# Patient Record
Sex: FEMALE | Race: NATIVE HAWAIIAN OR OTHER PACIFIC ISLANDER | URBAN - METROPOLITAN AREA
[De-identification: names, ages, dates, MRNs, and addresses within clinical notes are randomized per-mention and may not be internally consistent; named-entity substitution may affect disease eponyms.]

---

## 2021-05-07 ENCOUNTER — OFFICE VISIT (OUTPATIENT)
Dept: URBAN - METROPOLITAN AREA CLINIC 91 | Facility: CLINIC | Age: 56
End: 2021-05-07
Payer: MEDICARE

## 2021-05-07 DIAGNOSIS — H40.1131 PRIMARY OPEN-ANGLE GLAUCOMA, BILATERAL, MILD STAGE: Primary | ICD-10-CM

## 2021-05-07 DIAGNOSIS — H52.4 PRESBYOPIA: ICD-10-CM

## 2021-05-07 PROCEDURE — 92004 COMPRE OPH EXAM NEW PT 1/>: CPT | Performed by: OPHTHALMOLOGY

## 2021-05-07 ASSESSMENT — INTRAOCULAR PRESSURE
OD: 20
OS: 10

## 2021-05-07 ASSESSMENT — VISUAL ACUITY
OD: 20/30
OS: 20/20

## 2021-05-07 NOTE — IMPRESSION/PLAN
Impression: Primary open-angle glaucoma, bilateral, mild stage: H40.1131. Plan: Discussed primary open angle glaucoma of both eyes. I have stressed importance of patient compliance with follow-up appointments and using glaucoma drops as directed. I have explained the risk of irreversible vision loss and possible blindness associated with glaucoma. Will continue to monitor patient's glaucoma status with OCTg, visual field testing and fundus photography.

## 2021-08-13 ENCOUNTER — OFFICE VISIT (OUTPATIENT)
Dept: URBAN - METROPOLITAN AREA CLINIC 91 | Facility: CLINIC | Age: 56
End: 2021-08-13
Payer: MEDICARE

## 2021-08-13 DIAGNOSIS — Z94.7 CORNEAL TRANSPLANT STATUS: ICD-10-CM

## 2021-08-13 DIAGNOSIS — H04.123 DRY EYE SYNDROME OF BILATERAL LACRIMAL GLANDS: ICD-10-CM

## 2021-08-13 DIAGNOSIS — H26.491 OTHER SECONDARY CATARACT, RIGHT EYE: ICD-10-CM

## 2021-08-13 PROCEDURE — 99213 OFFICE O/P EST LOW 20 MIN: CPT | Performed by: OPHTHALMOLOGY

## 2021-08-13 PROCEDURE — 92083 EXTENDED VISUAL FIELD XM: CPT | Performed by: OPHTHALMOLOGY

## 2021-08-13 PROCEDURE — 92133 CPTRZD OPH DX IMG PST SGM ON: CPT | Performed by: OPHTHALMOLOGY

## 2021-08-13 RX ORDER — BRIMONIDINE TARTRATE, TIMOLOL MALEATE 2; 5 MG/ML; MG/ML
SOLUTION/ DROPS OPHTHALMIC
Qty: 7.5 | Refills: 3 | Status: INACTIVE
Start: 2021-08-13 | End: 2022-01-17

## 2021-08-13 ASSESSMENT — INTRAOCULAR PRESSURE
OD: 20
OS: 13

## 2021-08-13 NOTE — IMPRESSION/PLAN
Impression: Primary open-angle glaucoma, bilateral, mild stage: H40.1131. Plan: IOP's and OCTg/VF stable no changes OU. Continue using Combigan BID OD. Discussed primary open angle glaucoma of both eyes. I have stressed importance of patient compliance with follow-up appointments and using glaucoma drops as directed. I have explained the risk of irreversible vision loss and possible blindness associated with glaucoma. Will continue to monitor patient's glaucoma status with OCTg, visual field testing and fundus photography.

## 2021-08-13 NOTE — IMPRESSION/PLAN
Impression: Corneal transplant status: Z94.7. Plan: S/P cornea transplant right eye, recommend to continue using PF gtts QD OD, start Ofloxacin TID OD x3 days then d/c, will continue to monitor.

## 2022-04-01 ENCOUNTER — OFFICE VISIT (OUTPATIENT)
Dept: URBAN - METROPOLITAN AREA CLINIC 91 | Facility: CLINIC | Age: 57
End: 2022-04-01
Payer: MEDICARE

## 2022-04-01 PROCEDURE — 99214 OFFICE O/P EST MOD 30 MIN: CPT | Performed by: OPHTHALMOLOGY

## 2022-04-01 RX ORDER — BRIMONIDINE TARTRATE, TIMOLOL MALEATE 2; 5 MG/ML; MG/ML
SOLUTION/ DROPS OPHTHALMIC
Qty: 15 | Refills: 1 | Status: ACTIVE
Start: 2022-04-01

## 2022-04-01 RX ORDER — PREDNISOLONE ACETATE 10 MG/ML
1 % SUSPENSION/ DROPS OPHTHALMIC
Qty: 5 | Refills: 1 | Status: INACTIVE
Start: 2022-04-01 | End: 2022-04-01

## 2022-04-01 ASSESSMENT — INTRAOCULAR PRESSURE
OD: 29
OS: 10

## 2022-04-01 NOTE — IMPRESSION/PLAN
Impression: Primary open-angle glaucoma, bilateral, mild stage: H40.1131. Plan: Discussed primary open angle glaucoma of both eyes. I have stressed importance of patient compliance with follow-up appointments and using glaucoma drops as directed. I have explained the risk of irreversible vision loss and possible blindness associated with glaucoma. Will continue to monitor patient's glaucoma status with OCTg, visual field testing and fundus photography. Due to elevated IOP DC Prednisolone OD.

## 2022-04-15 ENCOUNTER — OFFICE VISIT (OUTPATIENT)
Dept: URBAN - METROPOLITAN AREA CLINIC 91 | Facility: CLINIC | Age: 57
End: 2022-04-15
Payer: MEDICARE

## 2022-04-15 DIAGNOSIS — H40.1131 PRIMARY OPEN-ANGLE GLAUCOMA, BILATERAL, MILD STAGE: Primary | ICD-10-CM

## 2022-04-15 PROCEDURE — 99213 OFFICE O/P EST LOW 20 MIN: CPT | Performed by: OPHTHALMOLOGY

## 2022-04-15 NOTE — IMPRESSION/PLAN
Impression: Primary open-angle glaucoma, bilateral, mild stage: H40.1131. Plan: Continue Combigan BID OU add lumigan to OD due to IOP and recheck in 3 weeks, sample provided Discussed primary open angle glaucoma of both eyes. I have stressed importance of patient compliance with follow-up appointments and using glaucoma drops as directed. I have explained the risk of irreversible vision loss and possible blindness associated with glaucoma. Will continue to monitor patient's glaucoma status with OCTg, visual field testing and fundus photography.

## 2022-05-11 ENCOUNTER — OFFICE VISIT (OUTPATIENT)
Dept: URBAN - METROPOLITAN AREA CLINIC 91 | Facility: CLINIC | Age: 57
End: 2022-05-11
Payer: MEDICARE

## 2022-05-11 DIAGNOSIS — H40.1131 PRIMARY OPEN-ANGLE GLAUCOMA, BILATERAL, MILD STAGE: ICD-10-CM

## 2022-05-11 DIAGNOSIS — H11.32 SUBCONJUNCTIVAL HEMORRHAGE OF LEFT EYE: ICD-10-CM

## 2022-05-11 PROCEDURE — 99214 OFFICE O/P EST MOD 30 MIN: CPT | Performed by: OPHTHALMOLOGY

## 2022-05-11 RX ORDER — PREDNISOLONE ACETATE 10 MG/ML
1 % SUSPENSION/ DROPS OPHTHALMIC
Qty: 5 | Refills: 2 | Status: ACTIVE
Start: 2022-05-11

## 2022-05-11 ASSESSMENT — INTRAOCULAR PRESSURE
OD: 19
OS: 13

## 2022-05-11 ASSESSMENT — VISUAL ACUITY: OD: 20/80

## 2022-05-11 NOTE — IMPRESSION/PLAN
Impression: Corneal transplant status: Z94.7. Plan: Due to sudden decrease in OD [facility] I am recommending patient to be seen for a corneal evaluation with Dr. Rajwinder Hager. Possible edematous corneal findings OD. Patient instructed to start Predforte QID OD.

## 2022-05-11 NOTE — IMPRESSION/PLAN
Impression: Primary open-angle glaucoma, bilateral, mild stage: H40.1131. Plan: IOP stable, continue Lumigan and [place] Discussed primary open angle glaucoma of both eyes. I have stressed importance of patient compliance with follow-up appointments and using glaucoma drops as directed. I have explained the risk of irreversible vision loss and possible blindness associated with glaucoma. Will continue to monitor patient's glaucoma status with OCTg, visual field testing and fundus photography.

## 2022-05-17 ENCOUNTER — OFFICE VISIT (OUTPATIENT)
Dept: URBAN - METROPOLITAN AREA CLINIC 91 | Facility: CLINIC | Age: 57
End: 2022-05-17
Payer: MEDICARE

## 2022-05-17 DIAGNOSIS — Z94.7 CORNEAL TRANSPLANT STATUS: Primary | ICD-10-CM

## 2022-05-17 PROCEDURE — 99212 OFFICE O/P EST SF 10 MIN: CPT | Performed by: SPECIALIST

## 2022-05-17 PROCEDURE — 92025 CPTRIZED CORNEAL TOPOGRAPHY: CPT | Performed by: SPECIALIST

## 2022-05-17 ASSESSMENT — INTRAOCULAR PRESSURE
OS: 10
OD: 17

## 2022-05-17 NOTE — IMPRESSION/PLAN
Impression: Corneal transplant status: Z94.7. Plan: Reviewed pentacam and discussed PKP status OD, cornea edema has resolved. Continue PF QID OD and in 6 months taper down gradually.  No sx recommended at this time

## 2022-06-22 ENCOUNTER — OFFICE VISIT (OUTPATIENT)
Dept: URBAN - METROPOLITAN AREA CLINIC 91 | Facility: CLINIC | Age: 57
End: 2022-06-22
Payer: MEDICARE

## 2022-06-22 DIAGNOSIS — Z94.7 CORNEAL TRANSPLANT STATUS: ICD-10-CM

## 2022-06-22 DIAGNOSIS — H40.1131 PRIMARY OPEN-ANGLE GLAUCOMA, BILATERAL, MILD STAGE: Primary | ICD-10-CM

## 2022-06-22 PROCEDURE — 99213 OFFICE O/P EST LOW 20 MIN: CPT | Performed by: OPHTHALMOLOGY

## 2022-06-22 RX ORDER — BRIMONIDINE TARTRATE, TIMOLOL MALEATE 2; 5 MG/ML; MG/ML
SOLUTION/ DROPS OPHTHALMIC
Qty: 15 | Refills: 3 | Status: ACTIVE
Start: 2022-06-22

## 2022-06-22 ASSESSMENT — INTRAOCULAR PRESSURE
OD: 18
OS: 8

## 2022-06-22 NOTE — IMPRESSION/PLAN
Impression: Primary open-angle glaucoma, bilateral, mild stage: H40.1131. Plan: IOP's stable d/c Lumigan and continue using Combigan BID OD. Discussed primary open angle glaucoma of both eyes. I have stressed importance of patient compliance with follow-up appointments and using glaucoma drops as directed. I have explained the risk of irreversible vision loss and possible blindness associated with glaucoma. Will continue to monitor patient's glaucoma status with OCTg, visual field testing and fundus photography.

## 2022-07-22 ENCOUNTER — OFFICE VISIT (OUTPATIENT)
Dept: URBAN - METROPOLITAN AREA CLINIC 91 | Facility: CLINIC | Age: 57
End: 2022-07-22
Payer: MEDICARE

## 2022-07-22 DIAGNOSIS — H40.1131 PRIMARY OPEN-ANGLE GLAUCOMA, BILATERAL, MILD STAGE: Primary | ICD-10-CM

## 2022-07-22 DIAGNOSIS — Z94.7 CORNEAL TRANSPLANT STATUS: ICD-10-CM

## 2022-07-22 DIAGNOSIS — B30.8 OTHER VIRAL CONJUNCTIVITIS: ICD-10-CM

## 2022-07-22 PROCEDURE — 99213 OFFICE O/P EST LOW 20 MIN: CPT | Performed by: OPHTHALMOLOGY

## 2022-07-22 RX ORDER — PREDNISOLONE ACETATE 10 MG/ML
1 % SUSPENSION/ DROPS OPHTHALMIC
Qty: 5 | Refills: 2 | Status: ACTIVE
Start: 2022-07-22

## 2022-07-22 RX ORDER — NEOMYCIN SULFATE, POLYMYXIN B SULFATE AND DEXAMETHASONE 3.5; 10000; 1 MG/ML; [USP'U]/ML; MG/ML
SUSPENSION OPHTHALMIC
Qty: 5 | Refills: 0 | Status: ACTIVE
Start: 2022-07-22

## 2022-07-22 ASSESSMENT — INTRAOCULAR PRESSURE
OS: 13
OD: 18

## 2022-07-22 NOTE — IMPRESSION/PLAN
Impression: Primary open-angle glaucoma, bilateral, mild stage: H40.1131. Plan: IOP's stable OU. Continue using Combigan BID OU. Discussed primary open angle glaucoma of both eyes. I have stressed importance of patient compliance with follow-up appointments and using glaucoma drops as directed. I have explained the risk of irreversible vision loss and possible blindness associated with glaucoma. Will continue to monitor patient's glaucoma status with OCTg, visual field testing and fundus photography.

## 2022-07-22 NOTE — IMPRESSION/PLAN
Impression: Corneal transplant status: Z94.7. Plan: S/P cornea transplant OD, doing well, recommend to use PF gtts BID OD. Will continue to monitor.

## 2022-07-22 NOTE — IMPRESSION/PLAN
Impression: Other viral conjunctivitis: B30.8. Plan: For bacterial conjunctivitis, I have stressed the contagious nature of the infection, and patient should wash their hands frequently in order to avoid spreading to others. I have also given patient Rx for topical antibiotic to use as directed. Start Maxitrol gtts BID OS.

## 2022-08-03 ENCOUNTER — OFFICE VISIT (OUTPATIENT)
Dept: URBAN - METROPOLITAN AREA CLINIC 91 | Facility: CLINIC | Age: 57
End: 2022-08-03
Payer: MEDICARE

## 2022-08-03 DIAGNOSIS — Z94.7 CORNEAL TRANSPLANT STATUS: ICD-10-CM

## 2022-08-03 DIAGNOSIS — B30.8 OTHER VIRAL CONJUNCTIVITIS: Primary | ICD-10-CM

## 2022-08-03 PROCEDURE — 99213 OFFICE O/P EST LOW 20 MIN: CPT | Performed by: OPHTHALMOLOGY

## 2022-08-03 RX ORDER — PREDNISOLONE ACETATE 10 MG/ML
1 % SUSPENSION/ DROPS OPHTHALMIC
Qty: 5 | Refills: 3 | Status: ACTIVE
Start: 2022-08-03

## 2022-08-03 NOTE — IMPRESSION/PLAN
Impression: Other viral conjunctivitis: B30.8.  Plan: OS Conjunctiviis resolved discontinue maxitrol gtts

## 2022-11-09 ENCOUNTER — OFFICE VISIT (OUTPATIENT)
Dept: URBAN - METROPOLITAN AREA CLINIC 91 | Facility: CLINIC | Age: 57
End: 2022-11-09
Payer: MEDICARE

## 2022-11-09 DIAGNOSIS — H40.1131 PRIMARY OPEN-ANGLE GLAUCOMA, BILATERAL, MILD STAGE: Primary | ICD-10-CM

## 2022-11-09 PROCEDURE — 99213 OFFICE O/P EST LOW 20 MIN: CPT | Performed by: OPHTHALMOLOGY

## 2022-11-09 RX ORDER — LATANOPROST 50 UG/ML
0.005 % SOLUTION OPHTHALMIC
Qty: 7.5 | Refills: 1 | Status: ACTIVE
Start: 2022-11-09

## 2022-11-09 ASSESSMENT — INTRAOCULAR PRESSURE
OS: 8
OD: 38

## 2022-11-09 NOTE — IMPRESSION/PLAN
Impression: Primary open-angle glaucoma, bilateral, mild stage: H40.1131. Plan: Due to elevated IOP OD recommend to start Latanoprost QHS OD and continue using Combigan BID OD. Discussed primary open angle glaucoma of both eyes. I have stressed importance of patient compliance with follow-up appointments and using glaucoma drops as directed. I have explained the risk of irreversible vision loss and possible blindness associated with glaucoma. Will continue to monitor patient's glaucoma status with OCTg, visual field testing and fundus photography.

## 2022-12-06 ENCOUNTER — OFFICE VISIT (OUTPATIENT)
Dept: URBAN - METROPOLITAN AREA CLINIC 91 | Facility: CLINIC | Age: 57
End: 2022-12-06
Payer: MEDICARE

## 2022-12-06 DIAGNOSIS — H40.1131 PRIMARY OPEN-ANGLE GLAUCOMA, BILATERAL, MILD STAGE: Primary | ICD-10-CM

## 2022-12-06 DIAGNOSIS — Z94.7 CORNEAL TRANSPLANT STATUS: ICD-10-CM

## 2022-12-06 PROCEDURE — 99213 OFFICE O/P EST LOW 20 MIN: CPT | Performed by: OPHTHALMOLOGY

## 2022-12-06 RX ORDER — BRIMONIDINE TARTRATE, TIMOLOL MALEATE 2; 5 MG/ML; MG/ML
SOLUTION/ DROPS OPHTHALMIC
Qty: 15 | Refills: 3 | Status: ACTIVE
Start: 2022-12-06

## 2022-12-06 RX ORDER — OFLOXACIN 3 MG/ML
0.3 % SOLUTION/ DROPS OPHTHALMIC
Qty: 5 | Refills: 0 | Status: ACTIVE
Start: 2022-12-06

## 2022-12-06 RX ORDER — LATANOPROST 50 UG/ML
0.005 % SOLUTION OPHTHALMIC
Qty: 7.5 | Refills: 3 | Status: ACTIVE
Start: 2022-12-06

## 2022-12-06 ASSESSMENT — INTRAOCULAR PRESSURE
OS: 7
OD: 20

## 2022-12-06 NOTE — IMPRESSION/PLAN
Impression: Primary open-angle glaucoma, bilateral, mild stage: H40.1131. Plan: IOP has improved Continue Latanoprost QHS OD and continue using Combigan BID OD. Discussed primary open angle glaucoma of both eyes. I have stressed importance of patient compliance with follow-up appointments and using glaucoma drops as directed. I have explained the risk of irreversible vision loss and possible blindness associated with glaucoma. Will continue to monitor patient's glaucoma status with OCTg, visual field testing and fundus photography.

## 2022-12-06 NOTE — IMPRESSION/PLAN
Impression: Corneal transplant status: Z94.7. Plan:  To prevent infection instructed patient to start Ofloaxcin gtts TID OU

## 2022-12-20 ENCOUNTER — OFFICE VISIT (OUTPATIENT)
Dept: URBAN - METROPOLITAN AREA CLINIC 91 | Facility: CLINIC | Age: 57
End: 2022-12-20
Payer: MEDICARE

## 2022-12-20 DIAGNOSIS — H04.123 DRY EYE SYNDROME OF BILATERAL LACRIMAL GLANDS: ICD-10-CM

## 2022-12-20 DIAGNOSIS — H40.1131 PRIMARY OPEN-ANGLE GLAUCOMA, BILATERAL, MILD STAGE: Primary | ICD-10-CM

## 2022-12-20 PROCEDURE — 99213 OFFICE O/P EST LOW 20 MIN: CPT | Performed by: OPHTHALMOLOGY

## 2022-12-20 ASSESSMENT — INTRAOCULAR PRESSURE
OS: 9
OD: 27

## 2022-12-20 NOTE — IMPRESSION/PLAN
Impression: Primary open-angle glaucoma, bilateral, mild stage: H40.1131. Plan: Borderline IOP's emphasize importance of using glaucoma drops directed. Continue using Latanoprost QHS OD and continue using Combigan BID OD. Discussed primary open angle glaucoma of both eyes. I have stressed importance of patient compliance with follow-up appointments and using glaucoma drops as directed. I have explained the risk of irreversible vision loss and possible blindness associated with glaucoma.

## 2023-01-03 ENCOUNTER — OFFICE VISIT (OUTPATIENT)
Dept: URBAN - METROPOLITAN AREA CLINIC 91 | Facility: CLINIC | Age: 58
End: 2023-01-03
Payer: MEDICARE

## 2023-01-03 DIAGNOSIS — H40.1131 PRIMARY OPEN-ANGLE GLAUCOMA, BILATERAL, MILD STAGE: Primary | ICD-10-CM

## 2023-01-03 DIAGNOSIS — H04.123 DRY EYE SYNDROME OF BILATERAL LACRIMAL GLANDS: ICD-10-CM

## 2023-01-03 PROCEDURE — 99213 OFFICE O/P EST LOW 20 MIN: CPT | Performed by: OPHTHALMOLOGY

## 2023-01-03 ASSESSMENT — INTRAOCULAR PRESSURE
OS: 9
OD: 22

## 2023-01-03 NOTE — IMPRESSION/PLAN
Impression: Primary open-angle glaucoma, bilateral, mild stage: H40.1131. Plan: IOP's improved,  Continue using Latanoprost QHS OD and continue using Combigan BID OD. Discussed primary open angle glaucoma of both eyes. I have stressed importance of patient compliance with follow-up appointments and using glaucoma drops as directed. I have explained the risk of irreversible vision loss and possible blindness associated with glaucoma.

## 2023-02-23 ENCOUNTER — OFFICE VISIT (OUTPATIENT)
Dept: URBAN - METROPOLITAN AREA CLINIC 91 | Facility: CLINIC | Age: 58
End: 2023-02-23
Payer: MEDICARE

## 2023-02-23 DIAGNOSIS — H40.1131 PRIMARY OPEN-ANGLE GLAUCOMA, BILATERAL, MILD STAGE: ICD-10-CM

## 2023-02-23 DIAGNOSIS — H04.123 DRY EYE SYNDROME OF BILATERAL LACRIMAL GLANDS: Primary | ICD-10-CM

## 2023-02-23 DIAGNOSIS — Z94.7 CORNEAL TRANSPLANT STATUS: ICD-10-CM

## 2023-02-23 PROCEDURE — 99213 OFFICE O/P EST LOW 20 MIN: CPT | Performed by: OPHTHALMOLOGY

## 2023-02-23 RX ORDER — BRIMONIDINE TARTRATE, TIMOLOL MALEATE 2; 5 MG/ML; MG/ML
SOLUTION/ DROPS OPHTHALMIC
Qty: 15 | Refills: 3 | Status: ACTIVE
Start: 2023-02-23

## 2023-02-23 RX ORDER — PREDNISOLONE ACETATE 10 MG/ML
1 % SUSPENSION/ DROPS OPHTHALMIC
Qty: 5 | Refills: 3 | Status: ACTIVE
Start: 2023-02-23

## 2023-02-23 ASSESSMENT — INTRAOCULAR PRESSURE
OD: 26
OS: 10

## 2023-02-23 ASSESSMENT — VISUAL ACUITY
OD: 20/80
OS: 20/50

## 2023-02-23 NOTE — IMPRESSION/PLAN
Impression: Primary open-angle glaucoma, bilateral, mild stage: H40.1131. Plan: Explained to patient elevated IOP's are due to diffuse PEK's OU, Continue using Combigan BID  and Latanoprost QHS OU. Will continue to monitor.

## 2023-02-23 NOTE — IMPRESSION/PLAN
Impression: Corneal transplant status: Z94.7. Plan: Instructed patient to increase Pred gtts BID OU due to severe dry eyes.

## 2023-03-23 ENCOUNTER — OFFICE VISIT (OUTPATIENT)
Dept: URBAN - METROPOLITAN AREA CLINIC 91 | Facility: CLINIC | Age: 58
End: 2023-03-23
Payer: MEDICARE

## 2023-03-23 DIAGNOSIS — Z94.7 CORNEAL TRANSPLANT STATUS: Primary | ICD-10-CM

## 2023-03-23 PROCEDURE — 99213 OFFICE O/P EST LOW 20 MIN: CPT | Performed by: OPHTHALMOLOGY

## 2023-03-23 ASSESSMENT — INTRAOCULAR PRESSURE
OD: 22
OS: 8

## 2023-03-23 NOTE — IMPRESSION/PLAN
Impression: Corneal transplant status: Z94.7. Plan: S/P cornea transplant Instructed patient to continue using  Pred gtts BID OD. Will continue to monitor.

## 2025-05-22 ENCOUNTER — APPOINTMENT (OUTPATIENT)
Age: 60
Setting detail: DERMATOLOGY
End: 2025-05-22

## 2025-05-22 DIAGNOSIS — L72.0 EPIDERMAL CYST: ICD-10-CM

## 2025-05-22 DIAGNOSIS — L73.2 HIDRADENITIS SUPPURATIVA: ICD-10-CM

## 2025-05-22 DIAGNOSIS — L81.4 OTHER MELANIN HYPERPIGMENTATION: ICD-10-CM

## 2025-05-22 DIAGNOSIS — D22 MELANOCYTIC NEVI: ICD-10-CM

## 2025-05-22 PROBLEM — D22.39 MELANOCYTIC NEVI OF OTHER PARTS OF FACE: Status: ACTIVE | Noted: 2025-05-22

## 2025-05-22 PROCEDURE — ? COUNSELING

## 2025-05-22 PROCEDURE — ? ADDITIONAL NOTES

## 2025-05-22 PROCEDURE — ? PRESCRIPTION

## 2025-05-22 PROCEDURE — 99203 OFFICE O/P NEW LOW 30 MIN: CPT

## 2025-05-22 PROCEDURE — ? TREATMENT REGIMEN

## 2025-05-22 PROCEDURE — ? BIMZELX INITIATION

## 2025-05-22 RX ORDER — DOXYCYCLINE HYCLATE 100 MG/1
CAPSULE, GELATIN COATED ORAL
Qty: 30 | Refills: 0 | Status: ERX | COMMUNITY
Start: 2025-05-22

## 2025-05-22 RX ORDER — CLINDAMYCIN PHOSPHATE 10 MG/G
GEL TOPICAL
Qty: 60 | Refills: 0 | Status: ERX | COMMUNITY
Start: 2025-05-22

## 2025-05-22 RX ADMIN — CLINDAMYCIN PHOSPHATE: 10 GEL TOPICAL at 00:00

## 2025-05-22 RX ADMIN — DOXYCYCLINE HYCLATE: 100 CAPSULE, GELATIN COATED ORAL at 00:00

## 2025-05-22 ASSESSMENT — LOCATION DETAILED DESCRIPTION DERM
LOCATION DETAILED: LEFT LABIUM MAJUS
LOCATION DETAILED: LEFT FOREHEAD
LOCATION DETAILED: LEFT INFERIOR CENTRAL MALAR CHEEK
LOCATION DETAILED: HAIR
LOCATION DETAILED: HAIR
LOCATION DETAILED: RIGHT LABIUM MAJUS

## 2025-05-22 ASSESSMENT — LOCATION ZONE DERM
LOCATION ZONE: SCALP
LOCATION ZONE: VULVA
LOCATION ZONE: SCALP
LOCATION ZONE: FACE

## 2025-05-22 ASSESSMENT — LOCATION SIMPLE DESCRIPTION DERM
LOCATION SIMPLE: LABIA MAJORA
LOCATION SIMPLE: HAIR
LOCATION SIMPLE: LEFT FOREHEAD
LOCATION SIMPLE: HAIR
LOCATION SIMPLE: LEFT CHEEK

## 2025-05-22 NOTE — PROCEDURE: ADDITIONAL NOTES
Render Risk Assessment In Note?: no
Detail Level: Simple
Additional Notes: Discussed with patient possibility of injection biologics, bimzelx, humira, cosentyx etc. informed patient of minimal infection, we could try to get this approved with a prior authorization.
Additional Notes: Patient will return in a month to go over cyst in scalp, I offered patient k3 injection today.

## 2025-05-22 NOTE — PROCEDURE: BIMZELX INITIATION
Bimzelx Dosing: 320 mg SC week 0, 4, 8, 12 and 16 then every 8 weeks thereafter
Detail Level: Zone
Diagnosis (Required): Hidradenitis Suppurativa
Add Pregnancy And Lactation Warning To Medication Counseling?: No
Pregnancy And Lactation Warning Text: The risk during pregnancy and breastfeeding is uncertain with this medication.
Bimzelx Monitoring Guidelines: A yearly test for tuberculosis is required while taking Bimzelx.

## 2025-05-22 NOTE — HPI: SKIN LESION
What Type Of Note Output Would You Prefer (Optional)?: Standard Output
How Severe Is Your Skin Lesion?: moderate
Is This A New Presentation, Or A Follow-Up?: Growths
I will SWITCH the dose or number of times a day I take the medications listed below when I get home from the hospital:  None

## 2025-05-22 NOTE — PROCEDURE: TREATMENT REGIMEN
Initiate Treatment: Benzoyl peroxide cleanser\\nClindamycin gel\\nDoxycycline 100mg  once daily
Detail Level: Zone

## 2025-07-22 ENCOUNTER — APPOINTMENT (OUTPATIENT)
Age: 60
Setting detail: DERMATOLOGY
End: 2025-07-22

## 2025-07-22 ENCOUNTER — RX ONLY (RX ONLY)
Age: 60
End: 2025-07-22

## 2025-07-22 DIAGNOSIS — L74.51 PRIMARY FOCAL HYPERHIDROSIS: ICD-10-CM

## 2025-07-22 DIAGNOSIS — H10.3 UNSPECIFIED ACUTE CONJUNCTIVITIS: ICD-10-CM

## 2025-07-22 DIAGNOSIS — L73.2 HIDRADENITIS SUPPURATIVA: ICD-10-CM

## 2025-07-22 DIAGNOSIS — L72.0 EPIDERMAL CYST: ICD-10-CM

## 2025-07-22 PROBLEM — L74.519 PRIMARY FOCAL HYPERHIDROSIS, UNSPECIFIED: Status: ACTIVE | Noted: 2025-07-22

## 2025-07-22 PROBLEM — H10.30 UNSPECIFIED ACUTE CONJUNCTIVITIS, UNSPECIFIED EYE: Status: ACTIVE | Noted: 2025-07-22

## 2025-07-22 PROCEDURE — ? TREATMENT REGIMEN

## 2025-07-22 PROCEDURE — ? INTRALESIONAL KENALOG

## 2025-07-22 PROCEDURE — ? COUNSELING

## 2025-07-22 PROCEDURE — ? PRESCRIPTION

## 2025-07-22 RX ORDER — DOXYCYCLINE HYCLATE 100 MG/1
CAPSULE, GELATIN COATED ORAL
Qty: 30 | Refills: 2 | Status: ERX

## 2025-07-22 RX ORDER — CLINDAMYCIN PHOSPHATE 10 MG/G
GEL TOPICAL
Qty: 60 | Refills: 0 | Status: ERX

## 2025-07-22 RX ORDER — ALUMINUM CHLORIDE 20 %
SOLUTION, NON-ORAL TOPICAL QHS
Qty: 60 | Refills: 0 | Status: ERX | COMMUNITY
Start: 2025-07-22

## 2025-07-22 RX ADMIN — Medication: at 00:00

## 2025-07-22 ASSESSMENT — LOCATION SIMPLE DESCRIPTION DERM
LOCATION SIMPLE: POSTERIOR SCALP
LOCATION SIMPLE: POSTERIOR SCALP

## 2025-07-22 ASSESSMENT — LOCATION ZONE DERM
LOCATION ZONE: SCALP
LOCATION ZONE: SCALP

## 2025-07-22 ASSESSMENT — LOCATION DETAILED DESCRIPTION DERM
LOCATION DETAILED: MID-OCCIPITAL SCALP
LOCATION DETAILED: MID-OCCIPITAL SCALP

## 2025-07-22 NOTE — PROCEDURE: TREATMENT REGIMEN
Initiate Treatment: Drysol
Detail Level: Zone
Continue Regimen:  Benzoyl peroxide cleanser, Clindamycin gel, Doxycycline

## 2025-07-22 NOTE — PROCEDURE: INTRALESIONAL KENALOG
How Many Mls Were Removed From The 40 Mg/Ml (1ml) Vial When Preparing The Injectable Solution?: 0
Ndc# For Kenalog Only: [id number]
Include Z78.9 (Other Specified Conditions Influencing Health Status) As An Associated Diagnosis?: No
Treatment Number (Optional): 1
Which Kenalog Vial Was Used?: Kenalog 10 mg/ml (5 ml vial)
Kenalog Type Of Vial: Multiple Dose
Kenalog Preparation: Kenalog
Medical Necessity Clause: This procedure was medically necessary because the lesions that were treated were: painful cystic acne
Consent: The risks of atrophy were reviewed with the patient.
Validate Note Data When Using Inventory: Yes
Detail Level: Detailed
Administered By (Optional): Cailin Jimenez PA-C
Show Inventory Tab: Hide
Concentration Of Kenalog Solution Injected (Mg/Ml): 3.0
Total Volume (Ccs): 0.5

## 2025-07-22 NOTE — HPI: CYST
How Severe Is Your Cyst?: moderate
Is This A New Presentation, Or A Follow-Up?: Cyst
Additional History: Patient was seen for this cyst, patient last deferred K3 injection.